# Patient Record
Sex: FEMALE | Race: WHITE
[De-identification: names, ages, dates, MRNs, and addresses within clinical notes are randomized per-mention and may not be internally consistent; named-entity substitution may affect disease eponyms.]

---

## 2017-03-09 ENCOUNTER — HOSPITAL ENCOUNTER (OUTPATIENT)
Dept: HOSPITAL 23 - CAMB | Age: 57
Discharge: HOME | End: 2017-03-09
Payer: COMMERCIAL

## 2017-03-09 DIAGNOSIS — Z01.818: Primary | ICD-10-CM

## 2017-03-09 DIAGNOSIS — F33.2: ICD-10-CM

## 2017-03-09 LAB
BARBITURATES UR QL SCN: 0.7 MG/DL (ref 0.2–2)
BARBITURATES UR QL SCN: 4.9 G/DL (ref 3.5–5)
BENZODIAZ UR QL SCN: 29 U/L (ref 10–42)
BENZODIAZ UR QL SCN: 32 U/L (ref 10–40)
BLOOD UREA NITROGEN: 12 MG/DL (ref 9–23)
BUN/CREATININE RATIO: 13.33
BZE UR QL SCN: 92 U/L (ref 32–92)
CALCIUM SERUM: 9.4 MG/DL (ref 8.4–10.2)
CK MB SERPL-RTO: 12.6 % (ref 11–15.5)
CK MB SERPL-RTO: 34.4 G/DL (ref 30–36)
CREATININE SERUM: 0.9 MG/DL (ref 0.6–1.4)
GLOM FILT RATE ESTIMATED: (no result) ML/MIN (ref 60–?)
GLUCOSE FASTING: 96 MG/DL (ref 70–110)
HEMATOCRIT: 41.8 % (ref 35–45)
HEMOGLOBIN: 14.4 GM/DL (ref 12–16)
KETONES UR QL: 107 MMOL/L (ref 100–111)
KETONES UR QL: 23 MMOL/L (ref 22–31)
MEAN CELL VOLUME: 90.7 FL (ref 83–96)
MEAN CORPUSCULAR HEMOGLOBIN: 31.2 PG (ref 28–34)
MEAN PLATELET VOLUME: 8.4 FL (ref 6.5–11.5)
PLATELET COUNT: 281 X10E3 (ref 140–420)
POTASSIUM: 4.1 MMOL/L (ref 3.5–5.1)
PROTEIN TOTAL SERUM: 7.6 G/DL (ref 6–8.3)
RED BLOOD COUNT: 4.61 X10E (ref 3.9–5.3)
SODIUM: 137 MMOL/L (ref 135–145)
U HYALINE CASTS AUWI: (no result) /[LPF]
URBCS1 AUWI: (no result) /[HPF] (ref 0–2)
URINE APPEARANCE: (no result)
URINE BACTERIA AUWI: (no result)
URINE BILIRUBIN: (no result)
URINE BLOOD: (no result)
URINE COLOR: YELLOW
URINE GLUCOSE: (no result) MG/DL
URINE KETONE: (no result)
URINE LEUKOCYTE ESTERASE: (no result)
URINE NITRATE: (no result)
URINE PH: 6 (ref 5–8)
URINE PROTEIN: (no result)
URINE SOURCE: (no result)
URINE SPECIFIC GRAVITY: 1.01 (ref 1–1.03)
URINE SQUAMOUS EPITHELIAL CELL: (no result) /[HPF]
URINE UROBILINOGEN: 0.2 MG/DL
UWBCS1 AUWI: (no result) (ref 0–5)
WHITE BLOOD COUNT: 5.9 X10E3 (ref 4–10.5)

## 2017-03-10 ENCOUNTER — HOSPITAL ENCOUNTER (OUTPATIENT)
Dept: HOSPITAL 23 - CSUR | Age: 57
Discharge: HOME | End: 2017-03-10
Payer: COMMERCIAL

## 2017-03-10 DIAGNOSIS — E03.9: ICD-10-CM

## 2017-03-10 DIAGNOSIS — F33.2: Primary | ICD-10-CM

## 2017-03-10 DIAGNOSIS — Z90.710: ICD-10-CM

## 2017-03-10 DIAGNOSIS — Z79.899: ICD-10-CM

## 2017-03-10 DIAGNOSIS — Z98.890: ICD-10-CM

## 2017-03-10 DIAGNOSIS — E78.5: ICD-10-CM

## 2017-03-10 DIAGNOSIS — Z88.8: ICD-10-CM

## 2017-03-13 ENCOUNTER — HOSPITAL ENCOUNTER (OUTPATIENT)
Dept: HOSPITAL 23 - CSUR | Age: 57
Discharge: HOME | End: 2017-03-13
Payer: COMMERCIAL

## 2017-03-13 DIAGNOSIS — Z88.8: ICD-10-CM

## 2017-03-13 DIAGNOSIS — Z90.710: ICD-10-CM

## 2017-03-13 DIAGNOSIS — Z85.3: ICD-10-CM

## 2017-03-13 DIAGNOSIS — E78.5: ICD-10-CM

## 2017-03-13 DIAGNOSIS — E03.9: ICD-10-CM

## 2017-03-13 DIAGNOSIS — F33.2: Primary | ICD-10-CM

## 2017-03-13 DIAGNOSIS — Z96.641: ICD-10-CM

## 2017-03-13 DIAGNOSIS — Z79.899: ICD-10-CM

## 2017-03-15 ENCOUNTER — HOSPITAL ENCOUNTER (OUTPATIENT)
Dept: HOSPITAL 23 - CSUR | Age: 57
Discharge: HOME | End: 2017-03-15
Payer: COMMERCIAL

## 2017-03-15 DIAGNOSIS — E78.5: ICD-10-CM

## 2017-03-15 DIAGNOSIS — Z96.641: ICD-10-CM

## 2017-03-15 DIAGNOSIS — Z98.890: ICD-10-CM

## 2017-03-15 DIAGNOSIS — Z88.8: ICD-10-CM

## 2017-03-15 DIAGNOSIS — Z90.710: ICD-10-CM

## 2017-03-15 DIAGNOSIS — F33.2: Primary | ICD-10-CM

## 2017-03-15 DIAGNOSIS — E03.9: ICD-10-CM

## 2017-03-15 DIAGNOSIS — Z79.899: ICD-10-CM

## 2017-03-20 ENCOUNTER — HOSPITAL ENCOUNTER (OUTPATIENT)
Dept: HOSPITAL 23 - CSUR | Age: 57
Discharge: HOME | End: 2017-03-20
Payer: COMMERCIAL

## 2017-03-20 DIAGNOSIS — Z88.6: ICD-10-CM

## 2017-03-20 DIAGNOSIS — E03.9: ICD-10-CM

## 2017-03-20 DIAGNOSIS — Z79.899: ICD-10-CM

## 2017-03-20 DIAGNOSIS — Z90.710: ICD-10-CM

## 2017-03-20 DIAGNOSIS — E78.00: ICD-10-CM

## 2017-03-20 DIAGNOSIS — F33.2: Primary | ICD-10-CM

## 2017-03-22 ENCOUNTER — HOSPITAL ENCOUNTER (OUTPATIENT)
Dept: HOSPITAL 23 - CSUR | Age: 57
Discharge: HOME | End: 2017-03-22
Payer: COMMERCIAL

## 2017-03-22 DIAGNOSIS — Z88.8: ICD-10-CM

## 2017-03-22 DIAGNOSIS — F33.2: Primary | ICD-10-CM

## 2017-03-22 DIAGNOSIS — Z79.899: ICD-10-CM

## 2017-03-22 DIAGNOSIS — E03.9: ICD-10-CM

## 2017-03-24 ENCOUNTER — HOSPITAL ENCOUNTER (OUTPATIENT)
Dept: HOSPITAL 23 - CSUR | Age: 57
Discharge: HOME | End: 2017-03-24
Payer: COMMERCIAL

## 2017-03-24 DIAGNOSIS — Z96.641: ICD-10-CM

## 2017-03-24 DIAGNOSIS — Z88.6: ICD-10-CM

## 2017-03-24 DIAGNOSIS — Z79.899: ICD-10-CM

## 2017-03-24 DIAGNOSIS — F33.2: Primary | ICD-10-CM

## 2017-03-24 DIAGNOSIS — E03.9: ICD-10-CM

## 2017-03-24 DIAGNOSIS — Z90.710: ICD-10-CM

## 2017-04-05 ENCOUNTER — HOSPITAL ENCOUNTER (OUTPATIENT)
Dept: HOSPITAL 23 - CSUR | Age: 57
Discharge: HOME | End: 2017-04-05
Payer: COMMERCIAL

## 2017-04-05 DIAGNOSIS — E78.00: ICD-10-CM

## 2017-04-05 DIAGNOSIS — F33.2: Primary | ICD-10-CM

## 2017-04-05 DIAGNOSIS — Z88.6: ICD-10-CM

## 2017-04-05 DIAGNOSIS — E03.9: ICD-10-CM

## 2017-04-05 DIAGNOSIS — Z79.899: ICD-10-CM

## 2017-04-28 ENCOUNTER — HOSPITAL ENCOUNTER (OUTPATIENT)
Dept: HOSPITAL 23 - CSUR | Age: 57
Discharge: HOME | End: 2017-04-28
Payer: COMMERCIAL

## 2017-04-28 DIAGNOSIS — F33.2: Primary | ICD-10-CM

## 2017-05-22 ENCOUNTER — HOSPITAL ENCOUNTER (OUTPATIENT)
Dept: HOSPITAL 23 - CSUR | Age: 57
Discharge: HOME | End: 2017-05-22
Attending: PSYCHIATRY & NEUROLOGY
Payer: COMMERCIAL

## 2017-05-22 DIAGNOSIS — Z79.899: ICD-10-CM

## 2017-05-22 DIAGNOSIS — Z88.6: ICD-10-CM

## 2017-05-22 DIAGNOSIS — E03.9: ICD-10-CM

## 2017-05-22 DIAGNOSIS — M19.90: ICD-10-CM

## 2017-05-22 DIAGNOSIS — E78.5: ICD-10-CM

## 2017-05-22 DIAGNOSIS — Z90.710: ICD-10-CM

## 2017-05-22 DIAGNOSIS — F33.2: Primary | ICD-10-CM

## 2017-05-22 DIAGNOSIS — Z85.3: ICD-10-CM

## 2017-05-22 DIAGNOSIS — Z88.8: ICD-10-CM

## 2017-05-22 RX ORDER — ATORVASTATIN CALCIUM 20 MG/1
TABLET, FILM COATED ORAL
Qty: 30 TABLET | Refills: 0 | OUTPATIENT
Start: 2017-05-22

## 2017-06-30 ENCOUNTER — HOSPITAL ENCOUNTER (OUTPATIENT)
Dept: HOSPITAL 23 - CSUR | Age: 57
Discharge: HOME | End: 2017-06-30
Attending: PSYCHIATRY & NEUROLOGY
Payer: COMMERCIAL

## 2017-06-30 DIAGNOSIS — F33.2: Primary | ICD-10-CM

## 2017-06-30 DIAGNOSIS — E03.9: ICD-10-CM

## 2017-06-30 DIAGNOSIS — Z90.710: ICD-10-CM

## 2017-06-30 DIAGNOSIS — Z79.899: ICD-10-CM

## 2017-06-30 DIAGNOSIS — Z88.8: ICD-10-CM

## 2017-06-30 DIAGNOSIS — Z85.3: ICD-10-CM

## 2017-06-30 DIAGNOSIS — M19.90: ICD-10-CM

## 2017-07-21 ENCOUNTER — HOSPITAL ENCOUNTER (OUTPATIENT)
Dept: HOSPITAL 23 - CSUR | Age: 57
Discharge: HOME | End: 2017-07-21
Attending: PSYCHIATRY & NEUROLOGY
Payer: COMMERCIAL

## 2017-07-21 DIAGNOSIS — Z88.8: ICD-10-CM

## 2017-07-21 DIAGNOSIS — M19.90: ICD-10-CM

## 2017-07-21 DIAGNOSIS — Z98.890: ICD-10-CM

## 2017-07-21 DIAGNOSIS — Z79.899: ICD-10-CM

## 2017-07-21 DIAGNOSIS — Z90.710: ICD-10-CM

## 2017-07-21 DIAGNOSIS — E03.9: ICD-10-CM

## 2017-07-21 DIAGNOSIS — Z85.3: ICD-10-CM

## 2017-07-21 DIAGNOSIS — F33.2: Primary | ICD-10-CM

## 2017-07-26 ENCOUNTER — HOSPITAL ENCOUNTER (OUTPATIENT)
Dept: HOSPITAL 23 - CSUR | Age: 57
Discharge: HOME | End: 2017-07-26
Attending: PSYCHIATRY & NEUROLOGY
Payer: COMMERCIAL

## 2017-07-26 DIAGNOSIS — Z90.710: ICD-10-CM

## 2017-07-26 DIAGNOSIS — Z96.641: ICD-10-CM

## 2017-07-26 DIAGNOSIS — F33.2: Primary | ICD-10-CM

## 2017-07-26 DIAGNOSIS — E03.9: ICD-10-CM

## 2017-07-26 DIAGNOSIS — Z98.890: ICD-10-CM

## 2017-07-26 DIAGNOSIS — Z88.8: ICD-10-CM

## 2017-07-26 DIAGNOSIS — Z79.899: ICD-10-CM

## 2017-08-07 ENCOUNTER — HOSPITAL ENCOUNTER (OUTPATIENT)
Dept: HOSPITAL 23 - CSUR | Age: 57
Discharge: HOME | End: 2017-08-07
Attending: PSYCHIATRY & NEUROLOGY
Payer: COMMERCIAL

## 2017-08-07 DIAGNOSIS — Z90.710: ICD-10-CM

## 2017-08-07 DIAGNOSIS — E03.9: ICD-10-CM

## 2017-08-07 DIAGNOSIS — Z96.641: ICD-10-CM

## 2017-08-07 DIAGNOSIS — F33.2: Primary | ICD-10-CM

## 2017-08-07 DIAGNOSIS — M19.90: ICD-10-CM

## 2017-08-07 DIAGNOSIS — Z90.12: ICD-10-CM

## 2017-08-07 DIAGNOSIS — Z88.6: ICD-10-CM

## 2017-08-07 DIAGNOSIS — Z85.828: ICD-10-CM

## 2017-08-07 DIAGNOSIS — Z98.890: ICD-10-CM

## 2017-11-03 ENCOUNTER — OFFICE VISIT (OUTPATIENT)
Dept: FAMILY MEDICINE CLINIC | Facility: CLINIC | Age: 57
End: 2017-11-03

## 2017-11-03 VITALS
RESPIRATION RATE: 16 BRPM | HEIGHT: 64 IN | OXYGEN SATURATION: 98 % | DIASTOLIC BLOOD PRESSURE: 78 MMHG | WEIGHT: 140 LBS | TEMPERATURE: 97.7 F | HEART RATE: 98 BPM | BODY MASS INDEX: 23.9 KG/M2 | SYSTOLIC BLOOD PRESSURE: 110 MMHG

## 2017-11-03 DIAGNOSIS — F41.9 ANXIETY: ICD-10-CM

## 2017-11-03 DIAGNOSIS — F33.2 SEVERE EPISODE OF RECURRENT MAJOR DEPRESSIVE DISORDER, WITHOUT PSYCHOTIC FEATURES (HCC): Primary | ICD-10-CM

## 2017-11-03 DIAGNOSIS — E03.9 ACQUIRED HYPOTHYROIDISM: ICD-10-CM

## 2017-11-03 DIAGNOSIS — M16.12 PRIMARY OSTEOARTHRITIS OF LEFT HIP: ICD-10-CM

## 2017-11-03 PROCEDURE — 99215 OFFICE O/P EST HI 40 MIN: CPT | Performed by: FAMILY MEDICINE

## 2017-11-03 RX ORDER — MULTIPLE VITAMINS W/ MINERALS TAB 9MG-400MCG
1 TAB ORAL DAILY
COMMUNITY

## 2017-11-03 RX ORDER — CHLORAL HYDRATE 500 MG
1 CAPSULE ORAL
COMMUNITY

## 2017-11-04 NOTE — PROGRESS NOTES
"Subjective   Yajaira Mccoy is a 57 y.o. female. Pt presented to the office alone to establish care, her prior PCP is Dr. Simona Patrick. She has concerns about her uncontrolled anxiety and depression.    Chief Complaint   Patient presents with   • Spot on Nose   • Establish Care     New Patient        History of Present Illness    Depression and Anxiety  This is a chronic problem for the pt. with onset dating back to 1993 when she was diagnosed with breast cancer. She did well with treatment of her breast cancer and reported that after about two years she really \"bounced back strong.\" In 2010 she had to have breast biopsy for what turned out to be benign lesion, but her depression recurred and has persisted and been difficult to treat since that time. She has worked with Dr. Joseline Serrano with [facility] psychiatry for the past 6 years. She started doing bilateral ECT 4 years ago with great success except the significant memory loss that was associated with the procedure became too troublesome and she discontinued the therapy. In [place] 2016 she resumed ECT, unilateral, with Dr. White at [facility] and is in the maintenance phase with a treatment due in 4 days. This is quite a time commitment as it requires hospital admission and she wonders about the long-term effects. So she started TMS (Transcranial Magnetic Stimulation) with Dr. Mendenhall recently and is still in the start up period, each session is out of pocket $300. She did learn that it would be safe for her to continue with her ECT maintenance treatments while participating in TMS. She also sees a counselor at [facility], has been going for years, gets along well with her counselor, has not been in several weeks.   Tested for effective antidepressant therapies and bupropion was indicated as appropriate. Has been on for little over one year and feels it has done better than all prior medications. Was recently increased from 300 mg daily to 375 mg daily. At this " time she was also increased on her alprazolam from 0.5 mg daily to 2 mg daily which she takes in the morning. She hates that she needs this, but she really does benefit from it, and hopes to wean back down. It has been stressful few weeks with her .   Pt reported appetite has been normal, she goes to Yoga 3 d/wk and swims 3-4 d/wk, [employer] on Sundays. She does want to sleep more, yesterday slept 12 hours overnight, she has insomnia, trouble falling asleep and has been taking Z-quil, Nyquil in the same night for sleep.   Has suicide attempt with medication overdose in 1993, her  found her and took her to the hospital. She reported she does think about killing herself but does not have a plan.   She has social stressors that exacerbate her depression. She moved here for her 's job and she is away from her close friends and daughters. She had tight knit group of friends and was entertaining friends weekly. She has not been able to make friends here. Visits her friends and daughters many weekends. Feels it is hard to talk to them about her depression because it makes them uncomfortable. Feels they try to keep their distance from her because of her depression.     Breast Cancer  1993, estrogen receptor positive, treated in [place], lumpectomy, radiation, chemo followed by 5 years of tamoxifen. Because ER + had hysterectomy around time she completed tamoxifen. Last mammogram was normal this spring.     Thyroid nodule  Hx of. Hypothyroid. Has had biopsy and nodules were not cancerous. Has not had follow up with ENT. Taking stable does of levothyroxine.      The following portions of the patient's history were reviewed and updated as appropriate: allergies, current medications, past family history, past medical history, past social history, past surgical history and problem list.          Review of Systems   Constitutional: Positive for fatigue. Negative for activity change, appetite change and  "unexpected weight change.   HENT: Negative for congestion, ear pain, postnasal drip, rhinorrhea and sinus pain.    Cardiovascular: Negative for chest pain.   Gastrointestinal: Negative for abdominal pain, blood in stool, constipation, diarrhea, nausea and vomiting.   Musculoskeletal: Negative for gait problem and joint swelling.   Neurological: Negative for weakness and headaches.   Psychiatric/Behavioral: Positive for dysphoric mood and sleep disturbance. Negative for self-injury and suicidal ideas. The patient is nervous/anxious.        Objective   Blood pressure 110/78, pulse 98, temperature 97.7 °F (36.5 °C), temperature source Oral, resp. rate 16, height 63.5\" (161.3 cm), weight 140 lb (63.5 kg), SpO2 98 %, not currently breastfeeding.  Physical Exam   Constitutional: She is oriented to person, place, and time. She appears well-nourished.   HENT:   Right Ear: External ear normal.   Left Ear: External ear normal.   Nose: Nose normal.   Eyes: Conjunctivae and EOM are normal. Right eye exhibits no discharge. Left eye exhibits no discharge. No scleral icterus.   Cardiovascular: Normal rate, regular rhythm, normal heart sounds and intact distal pulses.  Exam reveals no gallop and no friction rub.    No murmur heard.  Pulmonary/Chest: Effort normal and breath sounds normal. No respiratory distress. She has no wheezes. She has no rales.   Abdominal: Soft. Bowel sounds are normal. She exhibits no distension and no mass. There is no tenderness. There is no guarding.   Musculoskeletal: She exhibits no edema or deformity.   Neurological: She is alert and oriented to person, place, and time. She exhibits normal muscle tone. Coordination normal.   Skin: Skin is warm and dry.   Psychiatric:   Patient was tearful through most of appointment. Mood depressed and affect congruent.    Vitals reviewed.      Assessment/Plan   Yajaira was seen today for spot on nose and establish care.    Diagnoses and all orders for this " visit:    Severe episode of recurrent major depressive disorder, without psychotic features    Acquired hypothyroidism    Primary osteoarthritis of left hip    Anxiety    Other orders  -     Cancel: TSH Rfx On Abnormal To Free T4  -     Cancel: Vitamin D 1,25 Dihydroxy; Future      MDD: severe episode of recurrance  #1 discussed course of action with pt at length including her hx, therapies previously tried, social stressors  #2 encouraged pt to get back in regularly with her counselor  #3 discuss with her psychiatrist best plan in terms of continuing ECT vs TMS or both for a period of time  #4 pt does not need refills on medications, thought best to continue her current medication given prior efficacy and risk of worsening with weaning medication  #5 also discussed concern that maybe increase in bupropion could have exacerbated anxiety, worth asking her psychiatrist  #6 discussed getting family and friends involved and different ways to possibly do that, also encouraged her to engage with people at [employer], [employer] for more interaction, relationships, and things to look forward to  #7 discussed prior suicide attempt and pt has thoughts of suicide but no plan, says she just goes to bed when she gets the thoughts, encouraged her to call hot-line, call family, go to the hospital  #8 call office as she needs to, f/u in one month    Hypothyroid  #1 hx of nodules, established with ENT in [place], will have her follow up  #2 continue current dose of levothyroxine, TSH ordered, pt to return for collection as she has a TMS appointment to get to    Right hip osteoarthritis  #1 hx of left hip replacement with great success, interested in getting right done prior to the end of the year  #2 concern with rushing into surgery and missing appointments for TMS, ECT, counseling while recovering, encouraged her to consider discussing with ortho and maybe postpone and focus on maximum treatment of her depression and anxiety at  this time    Spent 80 minutes with pt and >50 % of the time was spent on the above mentioned counseling in regards to her depression, treatment, and her risk factors

## 2017-12-08 ENCOUNTER — OFFICE VISIT (OUTPATIENT)
Dept: FAMILY MEDICINE CLINIC | Facility: CLINIC | Age: 57
End: 2017-12-08

## 2017-12-08 VITALS
OXYGEN SATURATION: 98 % | TEMPERATURE: 97.7 F | DIASTOLIC BLOOD PRESSURE: 80 MMHG | SYSTOLIC BLOOD PRESSURE: 120 MMHG | HEIGHT: 64 IN | WEIGHT: 139 LBS | HEART RATE: 79 BPM | BODY MASS INDEX: 23.73 KG/M2

## 2017-12-08 DIAGNOSIS — E03.9 ACQUIRED HYPOTHYROIDISM: Primary | ICD-10-CM

## 2017-12-08 DIAGNOSIS — F33.2 SEVERE EPISODE OF RECURRENT MAJOR DEPRESSIVE DISORDER, WITHOUT PSYCHOTIC FEATURES (HCC): ICD-10-CM

## 2017-12-08 DIAGNOSIS — E04.2 MULTIPLE THYROID NODULES: ICD-10-CM

## 2017-12-08 PROCEDURE — 99214 OFFICE O/P EST MOD 30 MIN: CPT | Performed by: FAMILY MEDICINE

## 2017-12-08 RX ORDER — LORAZEPAM 1 MG/1
TABLET ORAL
COMMUNITY
Start: 2017-12-04 | End: 2018-05-21 | Stop reason: SDUPTHER

## 2017-12-08 RX ORDER — PRAMIPEXOLE DIHYDROCHLORIDE 0.5 MG/1
TABLET ORAL
COMMUNITY
Start: 2017-12-01 | End: 2018-05-21

## 2017-12-08 RX ORDER — BUPROPION HYDROCHLORIDE 75 MG/1
300 TABLET ORAL DAILY
COMMUNITY
Start: 2017-12-04 | End: 2019-05-17 | Stop reason: DRUGHIGH

## 2017-12-08 RX ORDER — TRIAMCINOLONE ACETONIDE 0.1 %
PASTE (GRAM) DENTAL
COMMUNITY
Start: 2017-10-17 | End: 2017-12-08

## 2017-12-08 RX ORDER — PRAMIPEXOLE DIHYDROCHLORIDE 0.25 MG/1
TABLET ORAL
COMMUNITY
Start: 2017-11-11 | End: 2017-12-08

## 2017-12-08 NOTE — PROGRESS NOTES
"Subjective   Yajaira Mccoy is a 57 y.o. female. Pt is here for follow up of depression and anxiety and hypothyroidism with multiple nodules. She is getting ready to have hip surgery in January 2018.    Chief Complaint   Patient presents with   • Hypothyroidism     follow up        History of Present Illness    Depression  Pt has continued on the wellbutrin 375 mg, ativan prn. She has started taking DOLLY supplement in place of the ativan and finds that it has been very helpful. Would like to be more natural and take less medications. She completed her ECT and has continued to do the TMS. She is very happy with her psychiatry care. She is 25 TMS sessions in and the target is 35. After that maintenance. She has good days and bad days but overall better than last time. Holidays are challenging emotionally. Loves to travel and see friends and family but hates to come back. Still talking with her  about her moving back to [facility] where she feels the best but she is reluctant. Still feels the anxiety is worse than the depression now.     Hypothyroidism  Has history of low thyroid and nodules, been a couple of years had an ultrasound and biopsy but never followed up with her ENT. Would like to take care of this before her hip replacement surgery.      The following portions of the patient's history were reviewed and updated as appropriate: allergies, current medications and problem list.      Review of Systems   Constitutional: Positive for activity change. Negative for appetite change.   Respiratory: Negative for chest tightness, shortness of breath and wheezing.    Cardiovascular: Negative for chest pain.   Musculoskeletal: Positive for arthralgias.       Objective   Blood pressure 120/80, pulse 79, temperature 97.7 °F (36.5 °C), height 161.3 cm (63.5\"), weight 63 kg (139 lb), SpO2 98 %, not currently breastfeeding.  Physical Exam   Constitutional: She is oriented to person, place, and time. She appears " well-nourished. No distress.   HENT:   Right Ear: External ear normal.   Left Ear: External ear normal.   Nose: Nose normal.   Eyes: Conjunctivae are normal. Right eye exhibits no discharge. Left eye exhibits no discharge. No scleral icterus.   Cardiovascular: Normal rate, regular rhythm and normal heart sounds.  Exam reveals no gallop and no friction rub.    No murmur heard.  Pulmonary/Chest: Effort normal and breath sounds normal. No respiratory distress. She has no wheezes. She has no rales.   Neurological: She is alert and oriented to person, place, and time. She exhibits normal muscle tone. Coordination normal.   Psychiatric: She has a normal mood and affect. Her behavior is normal.   Pt was little tearful when talking about difficulties at the holidays and depression.   Vitals reviewed.      Assessment/Plan   Yajaira was seen today for hypothyroidism.    Diagnoses and all orders for this visit:    Acquired hypothyroidism  -     US thyroid; Future    Multiple thyroid nodules  -     US thyroid; Future    Severe episode of recurrent major depressive disorder, without psychotic features      Pt scheduled this week for labs with gaytan 2/2 upcoming surgery. Will give script for TSH and vitamin D. Labs will be reviewed than will send letter for surgery clearance to ortho group.    Hypothyroid  TSH  Ultrasound  Pt to call and schedule with her established ENT for follow up    Depression and anxiety  Stable now with therapy  Again encouraged her to check with her psychiatrist about wellbutrin dose and worsened axiety

## 2017-12-18 ENCOUNTER — HOSPITAL ENCOUNTER (OUTPATIENT)
Dept: ULTRASOUND IMAGING | Facility: HOSPITAL | Age: 57
Discharge: HOME | End: 2017-12-18
Admitting: FAMILY MEDICINE

## 2017-12-18 DIAGNOSIS — E04.2 MULTIPLE THYROID NODULES: ICD-10-CM

## 2017-12-18 DIAGNOSIS — E03.9 ACQUIRED HYPOTHYROIDISM: ICD-10-CM

## 2017-12-18 PROCEDURE — 76536 US EXAM OF HEAD AND NECK: CPT

## 2017-12-26 ENCOUNTER — LAB (OUTPATIENT)
Dept: LAB | Facility: HOSPITAL | Age: 57
End: 2017-12-26

## 2017-12-26 ENCOUNTER — TRANSCRIBE ORDERS (OUTPATIENT)
Dept: ADMINISTRATIVE | Facility: HOSPITAL | Age: 57
End: 2017-12-26

## 2017-12-26 DIAGNOSIS — E03.9 MYXEDEMA HEART DISEASE: ICD-10-CM

## 2017-12-26 DIAGNOSIS — M25.50 PAIN IN JOINT, MULTIPLE SITES: Primary | ICD-10-CM

## 2017-12-26 DIAGNOSIS — E83.52 HYPERCALCEMIA: ICD-10-CM

## 2017-12-26 DIAGNOSIS — M25.50 PAIN IN JOINT, MULTIPLE SITES: ICD-10-CM

## 2017-12-26 DIAGNOSIS — I51.9 MYXEDEMA HEART DISEASE: ICD-10-CM

## 2017-12-26 LAB
CA-I BLD-MCNC: 5.1 MG/DL (ref 4.6–5.4)
CA-I SERPL ISE-MCNC: 1.28 MMOL/L (ref 1.15–1.35)
PTH-INTACT SERPL-MCNC: 41.5 PG/ML (ref 15–65)
T3FREE SERPL-MCNC: 3.13 PG/ML (ref 2–4.4)
T4 FREE SERPL-MCNC: 1.46 NG/DL (ref 0.93–1.7)
TSH SERPL DL<=0.05 MIU/L-ACNC: 1.86 MIU/ML (ref 0.27–4.2)

## 2017-12-26 PROCEDURE — 84439 ASSAY OF FREE THYROXINE: CPT | Performed by: PHYSICIAN ASSISTANT

## 2017-12-26 PROCEDURE — 83970 ASSAY OF PARATHORMONE: CPT | Performed by: PHYSICIAN ASSISTANT

## 2017-12-26 PROCEDURE — 36415 COLL VENOUS BLD VENIPUNCTURE: CPT

## 2017-12-26 PROCEDURE — 82330 ASSAY OF CALCIUM: CPT | Performed by: PHYSICIAN ASSISTANT

## 2017-12-26 PROCEDURE — 84481 FREE ASSAY (FT-3): CPT | Performed by: PHYSICIAN ASSISTANT

## 2017-12-26 PROCEDURE — 84443 ASSAY THYROID STIM HORMONE: CPT

## 2018-01-09 ENCOUNTER — TELEPHONE (OUTPATIENT)
Dept: FAMILY MEDICINE CLINIC | Facility: CLINIC | Age: 58
End: 2018-01-09

## 2018-01-09 NOTE — TELEPHONE ENCOUNTER
Nurse from Dr Olvera office called stating that the patient is scheduled to have a total knee replacement on 1/16 and was recently seen by Dr Coates for surgery clearance the office is needing a note for clearance faxed to her office asa fax number is [phone]

## 2018-02-02 DIAGNOSIS — E03.9 ACQUIRED HYPOTHYROIDISM: ICD-10-CM

## 2018-02-02 RX ORDER — LEVOTHYROXINE SODIUM 0.05 MG/1
50 TABLET ORAL DAILY
Qty: 90 TABLET | Refills: 1 | Status: SHIPPED | OUTPATIENT
Start: 2018-02-02 | End: 2018-03-15 | Stop reason: SDUPTHER

## 2018-03-13 ENCOUNTER — TELEPHONE (OUTPATIENT)
Dept: FAMILY MEDICINE CLINIC | Facility: CLINIC | Age: 58
End: 2018-03-13

## 2018-03-15 DIAGNOSIS — E03.9 ACQUIRED HYPOTHYROIDISM: ICD-10-CM

## 2018-03-15 RX ORDER — LEVOTHYROXINE SODIUM 0.05 MG/1
50 TABLET ORAL DAILY
Qty: 90 TABLET | Refills: 1 | Status: SHIPPED | OUTPATIENT
Start: 2018-03-15 | End: 2018-06-22 | Stop reason: SDUPTHER

## 2018-05-21 ENCOUNTER — TELEPHONE (OUTPATIENT)
Dept: FAMILY MEDICINE CLINIC | Facility: CLINIC | Age: 58
End: 2018-05-21

## 2018-05-21 ENCOUNTER — OFFICE VISIT (OUTPATIENT)
Dept: FAMILY MEDICINE CLINIC | Facility: CLINIC | Age: 58
End: 2018-05-21

## 2018-05-21 VITALS
WEIGHT: 147.5 LBS | HEIGHT: 64 IN | OXYGEN SATURATION: 97 % | TEMPERATURE: 97.8 F | DIASTOLIC BLOOD PRESSURE: 60 MMHG | SYSTOLIC BLOOD PRESSURE: 115 MMHG | HEART RATE: 70 BPM | BODY MASS INDEX: 25.18 KG/M2

## 2018-05-21 DIAGNOSIS — E03.9 HYPOTHYROIDISM, UNSPECIFIED TYPE: ICD-10-CM

## 2018-05-21 DIAGNOSIS — E53.8 VITAMIN B12 DEFICIENCY: ICD-10-CM

## 2018-05-21 DIAGNOSIS — E55.9 VITAMIN D DEFICIENCY: Primary | ICD-10-CM

## 2018-05-21 PROCEDURE — 99213 OFFICE O/P EST LOW 20 MIN: CPT | Performed by: NURSE PRACTITIONER

## 2018-05-21 RX ORDER — PRAMIPEXOLE DIHYDROCHLORIDE 0.5 MG/1
0.5 TABLET ORAL DAILY
COMMUNITY
End: 2019-05-17

## 2018-05-21 NOTE — TELEPHONE ENCOUNTER
Spoke with patient about her request  of LORAZEPAM, Dr Coates is suggesting that she see her psychiatrist for this medication. Patient stated that she just started seeing a new psychiatrit due to her old one retiring.

## 2018-05-21 NOTE — TELEPHONE ENCOUNTER
Patient called stating that she needs a refill send to [facility] for her LORAZEPAM she is out of the medication, may need a temporary sent to local pharmacy

## 2018-05-21 NOTE — PATIENT INSTRUCTIONS
Labs in the next week.    Fax paperwork to [phone]  Letter provided today.  Follow up with Dr Coates in 4-6 months

## 2018-05-21 NOTE — PROGRESS NOTES
Subjective   Yajaira Mccoy is a 58 y.o. 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 States she is required to be free from communicable disease in order to renew her license.     Insomnia   This is a new problem. The current episode started more than 1 month ago. The problem occurs intermittently. The problem has been waxing and waning. Pertinent negatives include no abdominal pain, anorexia, arthralgias, change in bowel habit, chest pain, chills, congestion, coughing, diaphoresis, fatigue, fever, headaches, joint swelling, myalgias, nausea, neck pain, numbness, rash, sore throat, swollen glands, urinary symptoms, vertigo, visual change, vomiting or weakness. Nothing aggravates the symptoms. Treatments tried: melatonin 10 mg. The treatment provided mild relief.   Hypothyroidism   This is a chronic problem. The current episode started more than 1 year ago. The problem occurs daily. The problem has been unchanged. Pertinent negatives include no abdominal pain, anorexia, arthralgias, change in bowel habit, chest pain, chills, congestion, coughing, diaphoresis, fatigue, fever, headaches, joint swelling, myalgias, nausea, neck pain, numbness, rash, sore throat, swollen glands, urinary symptoms, vertigo, visual change, vomiting or weakness. Nothing aggravates the symptoms. Treatments tried: levothyroxine. The treatment provided moderate relief.        The following portions of the patient's history were reviewed and updated as appropriate: allergies, current medications, past family history, past medical history, past social history, past surgical history and problem list.    Review of Systems   Constitutional: Negative.  Negative for chills, diaphoresis, fatigue and fever.   HENT: Negative.  Negative for congestion and sore throat.    Eyes: Negative.    Respiratory: Negative.  Negative for cough.    Cardiovascular: Negative.  Negative for chest pain.    Gastrointestinal: Negative.  Negative for abdominal pain, anorexia, change in bowel habit, nausea and vomiting.   Endocrine: Negative.    Genitourinary: Negative.    Musculoskeletal: Negative.  Negative for arthralgias, joint swelling, myalgias and neck pain.   Skin: Negative.  Negative for rash.   Allergic/Immunologic: Negative.    Neurological: Negative.  Negative for vertigo, weakness, numbness and headaches.   Hematological: Negative.    Psychiatric/Behavioral: Positive for sleep disturbance (started melatonin 10 with little relief). The patient has insomnia.        Objective   Physical Exam   Constitutional: She is oriented to person, place, and time. She appears well-developed and well-nourished.   HENT:   Head: Normocephalic.   Neck: Neck supple.   Cardiovascular: Normal rate, regular rhythm and normal heart sounds.  Exam reveals no gallop and no friction rub.    No murmur heard.  Pulmonary/Chest: Breath sounds normal. No respiratory distress. She has no wheezes. She has no rales.   Neurological: She is alert and oriented to person, place, and time.   Skin: Skin is warm and dry.   Psychiatric: She has a normal mood and affect.   Vitals reviewed.     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    Diagnoses and all orders for this visit:    Vitamin D deficiency  -     Vitamin D 25 Hydroxy; Future    Vitamin B12 deficiency  -     Vitamin B12; Future    Hypothyroidism, unspecified type  -     Thyroid Panel With TSH; Future      Will return in the future for labs. States her psychiatrist recommended another test and she would like to have them all completed at the same time.  Ok to proceed with licensing

## 2018-05-22 NOTE — TELEPHONE ENCOUNTER
She needs to call her psychiatry office. Just wanted to put it on record. I know we already talked about her.

## 2018-05-23 DIAGNOSIS — Z12.39 SCREENING FOR BREAST CANCER: Primary | ICD-10-CM

## 2018-05-23 DIAGNOSIS — Z85.3 HISTORY OF BREAST CANCER: ICD-10-CM

## 2018-05-24 ENCOUNTER — TRANSCRIBE ORDERS (OUTPATIENT)
Dept: ADMINISTRATIVE | Facility: HOSPITAL | Age: 58
End: 2018-05-24

## 2018-05-24 DIAGNOSIS — Z12.31 VISIT FOR SCREENING MAMMOGRAM: Primary | ICD-10-CM

## 2018-05-26 ENCOUNTER — RESULTS ENCOUNTER (OUTPATIENT)
Dept: FAMILY MEDICINE CLINIC | Facility: CLINIC | Age: 58
End: 2018-05-26

## 2018-05-26 DIAGNOSIS — E55.9 VITAMIN D DEFICIENCY: ICD-10-CM

## 2018-05-26 DIAGNOSIS — E03.9 HYPOTHYROIDISM, UNSPECIFIED TYPE: ICD-10-CM

## 2018-05-26 DIAGNOSIS — E53.8 VITAMIN B12 DEFICIENCY: ICD-10-CM

## 2018-06-05 LAB
25(OH)D3+25(OH)D2 SERPL-MCNC: 43.6 NG/ML (ref 30–100)
FT4I SERPL CALC-MCNC: 2.4 (ref 1.2–4.9)
T3RU NFR SERPL: 28 % (ref 24–39)
T4 SERPL-MCNC: 8.6 UG/DL (ref 4.5–12)
TSH SERPL DL<=0.005 MIU/L-ACNC: 1.9 UIU/ML (ref 0.45–4.5)
VIT B12 SERPL-MCNC: 899 PG/ML (ref 211–946)

## 2018-06-08 ENCOUNTER — HOSPITAL ENCOUNTER (OUTPATIENT)
Dept: MAMMOGRAPHY | Facility: HOSPITAL | Age: 58
Discharge: HOME | End: 2018-06-08
Admitting: FAMILY MEDICINE

## 2018-06-08 DIAGNOSIS — Z12.31 VISIT FOR SCREENING MAMMOGRAM: ICD-10-CM

## 2018-06-08 PROCEDURE — 77063 BREAST TOMOSYNTHESIS BI: CPT

## 2018-06-08 PROCEDURE — 77067 SCR MAMMO BI INCL CAD: CPT

## 2018-06-22 ENCOUNTER — TELEPHONE (OUTPATIENT)
Dept: FAMILY MEDICINE CLINIC | Facility: CLINIC | Age: 58
End: 2018-06-22

## 2018-06-22 DIAGNOSIS — E78.5 HYPERLIPIDEMIA, UNSPECIFIED HYPERLIPIDEMIA TYPE: ICD-10-CM

## 2018-06-22 DIAGNOSIS — E03.9 ACQUIRED HYPOTHYROIDISM: ICD-10-CM

## 2018-06-25 RX ORDER — ATORVASTATIN CALCIUM 20 MG/1
20 TABLET, FILM COATED ORAL DAILY
Qty: 90 TABLET | Refills: 0 | Status: SHIPPED | OUTPATIENT
Start: 2018-06-25 | End: 2018-08-27 | Stop reason: SDUPTHER

## 2018-06-25 RX ORDER — LEVOTHYROXINE SODIUM 0.05 MG/1
50 TABLET ORAL DAILY
Qty: 90 TABLET | Refills: 0 | Status: SHIPPED | OUTPATIENT
Start: 2018-06-25 | End: 2018-08-27 | Stop reason: SDUPTHER

## 2018-08-27 DIAGNOSIS — E78.5 HYPERLIPIDEMIA, UNSPECIFIED HYPERLIPIDEMIA TYPE: ICD-10-CM

## 2018-08-27 DIAGNOSIS — E03.9 ACQUIRED HYPOTHYROIDISM: ICD-10-CM

## 2018-08-27 RX ORDER — LEVOTHYROXINE SODIUM 0.05 MG/1
50 TABLET ORAL DAILY
Qty: 90 TABLET | Refills: 1 | Status: SHIPPED | OUTPATIENT
Start: 2018-08-27 | End: 2019-03-11 | Stop reason: SDUPTHER

## 2018-08-27 RX ORDER — ATORVASTATIN CALCIUM 20 MG/1
20 TABLET, FILM COATED ORAL DAILY
Qty: 90 TABLET | Refills: 1 | Status: SHIPPED | OUTPATIENT
Start: 2018-08-27 | End: 2019-02-23 | Stop reason: SDUPTHER

## 2018-11-05 ENCOUNTER — TELEPHONE (OUTPATIENT)
Dept: FAMILY MEDICINE CLINIC | Facility: CLINIC | Age: 58
End: 2018-11-05

## 2018-11-05 NOTE — TELEPHONE ENCOUNTER
Patient called stating that she stepped on a piece of glass a month ago and her foot is still sore and wants to know who can she go to get the glass removed

## 2018-11-08 ENCOUNTER — TELEPHONE (OUTPATIENT)
Dept: FAMILY MEDICINE CLINIC | Facility: CLINIC | Age: 58
End: 2018-11-08

## 2018-11-08 DIAGNOSIS — M79.673 PAIN OF FOOT, UNSPECIFIED LATERALITY: ICD-10-CM

## 2018-11-08 DIAGNOSIS — M79.5 FOREIGN BODY (FB) IN SOFT TISSUE: Primary | ICD-10-CM

## 2018-11-08 NOTE — TELEPHONE ENCOUNTER
Patient called stating that she stepped on a piece of glass about a month ago and the spot is  and sore. Patient is unsure what to do.

## 2018-11-09 ENCOUNTER — TELEPHONE (OUTPATIENT)
Dept: FAMILY MEDICINE CLINIC | Facility: CLINIC | Age: 58
End: 2018-11-09

## 2018-12-07 DIAGNOSIS — E03.9 ACQUIRED HYPOTHYROIDISM: Primary | ICD-10-CM

## 2018-12-08 ENCOUNTER — LAB (OUTPATIENT)
Dept: LAB | Facility: HOSPITAL | Age: 58
End: 2018-12-08

## 2018-12-08 DIAGNOSIS — E03.9 ACQUIRED HYPOTHYROIDISM: ICD-10-CM

## 2018-12-08 LAB — TSH SERPL DL<=0.05 MIU/L-ACNC: 1.68 MIU/ML (ref 0.27–4.2)

## 2018-12-08 PROCEDURE — 36415 COLL VENOUS BLD VENIPUNCTURE: CPT

## 2018-12-08 PROCEDURE — 84443 ASSAY THYROID STIM HORMONE: CPT

## 2019-02-23 DIAGNOSIS — E78.5 HYPERLIPIDEMIA, UNSPECIFIED HYPERLIPIDEMIA TYPE: ICD-10-CM

## 2019-02-25 RX ORDER — ATORVASTATIN CALCIUM 20 MG/1
20 TABLET, FILM COATED ORAL DAILY
Qty: 90 TABLET | Refills: 0 | Status: SHIPPED | OUTPATIENT
Start: 2019-02-25 | End: 2019-03-11 | Stop reason: SDUPTHER

## 2019-03-11 DIAGNOSIS — E78.5 HYPERLIPIDEMIA, UNSPECIFIED HYPERLIPIDEMIA TYPE: ICD-10-CM

## 2019-03-11 DIAGNOSIS — E03.9 ACQUIRED HYPOTHYROIDISM: ICD-10-CM

## 2019-03-11 RX ORDER — ATORVASTATIN CALCIUM 20 MG/1
20 TABLET, FILM COATED ORAL DAILY
Qty: 90 TABLET | Refills: 0 | Status: SHIPPED | OUTPATIENT
Start: 2019-03-11 | End: 2019-07-14 | Stop reason: SDUPTHER

## 2019-03-11 RX ORDER — LEVOTHYROXINE SODIUM 0.05 MG/1
50 TABLET ORAL DAILY
Qty: 90 TABLET | Refills: 1 | Status: SHIPPED | OUTPATIENT
Start: 2019-03-11 | End: 2019-08-22 | Stop reason: SDUPTHER

## 2019-05-17 ENCOUNTER — OFFICE VISIT (OUTPATIENT)
Dept: FAMILY MEDICINE CLINIC | Facility: CLINIC | Age: 59
End: 2019-05-17

## 2019-05-17 VITALS
DIASTOLIC BLOOD PRESSURE: 78 MMHG | HEART RATE: 77 BPM | WEIGHT: 151.9 LBS | RESPIRATION RATE: 18 BRPM | HEIGHT: 64 IN | TEMPERATURE: 98.4 F | SYSTOLIC BLOOD PRESSURE: 110 MMHG | OXYGEN SATURATION: 98 % | BODY MASS INDEX: 25.93 KG/M2

## 2019-05-17 DIAGNOSIS — N39.46 MIXED STRESS AND URGE URINARY INCONTINENCE: ICD-10-CM

## 2019-05-17 DIAGNOSIS — R15.2 INCONTINENCE OF FECES WITH FECAL URGENCY: Primary | ICD-10-CM

## 2019-05-17 DIAGNOSIS — Z12.31 ENCOUNTER FOR SCREENING MAMMOGRAM FOR BREAST CANCER: ICD-10-CM

## 2019-05-17 DIAGNOSIS — Z12.11 COLON CANCER SCREENING: ICD-10-CM

## 2019-05-17 DIAGNOSIS — R15.9 INCONTINENCE OF FECES WITH FECAL URGENCY: Primary | ICD-10-CM

## 2019-05-17 PROCEDURE — 99214 OFFICE O/P EST MOD 30 MIN: CPT | Performed by: FAMILY MEDICINE

## 2019-05-17 RX ORDER — BUPROPION HYDROCHLORIDE 300 MG/1
TABLET ORAL
COMMUNITY
Start: 2019-05-08

## 2019-05-17 RX ORDER — PRAMIPEXOLE DIHYDROCHLORIDE 1 MG/1
2 TABLET ORAL DAILY
COMMUNITY

## 2019-05-17 NOTE — PROGRESS NOTES
"Chief Complaint   Patient presents with   • Urine Leakage     present for years but getting worse       Subjective   Yajaira Mccoy is a 59 y.o. female.     History of Present Illness   Stool and urine incontinence  Happening sporadically over several years. Happening more often. She says happening every 1-3 weeks. Happen separately from each other. Happened this week with the stool. Been a while since it happened with the urine.   Her BMs range from constipated and not going to a blow out. Liquid stool. She has high fiber diet, lots of veggies. Other BMs are loose but not liquid. Smell is terrible. She had colonoscopy at age 48 in [place] said it was normal.     Dr. Wilder Gutierrez at [facility] for psych. He does a lot of research.   She did TMS in 2017 with Dr. Alarcon.  Feels she is doing really well right now.  He added pramipexole fairly recently and feels this is helped with both the depression and especially the anxiety.    The following portions of the patient's history were reviewed and updated as appropriate: allergies, current medications, past medical history, past social history and problem list.    Review of Systems   Respiratory: Negative.    Cardiovascular: Negative.    Genitourinary: Positive for difficulty urinating, frequency and urgency.   Neurological: Negative.        Vitals:    05/17/19 0848   BP: 110/78   BP Location: Left arm   Patient Position: Sitting   Cuff Size: Adult   Pulse: 77   Resp: 18   Temp: 98.4 °F (36.9 °C)   TempSrc: Oral   SpO2: 98%   Weight: 68.9 kg (151 lb 14.4 oz)   Height: 161.3 cm (63.5\")       Objective   Physical Exam   Constitutional: She is oriented to person, place, and time. She appears well-nourished. No distress.   Eyes: Conjunctivae are normal. No scleral icterus.   Pulmonary/Chest: Effort normal. No respiratory distress.   Genitourinary:   Genitourinary Comments: Normal anus on exam.  One small external hemorrhoid.  No fissures or bleeding.  She had good rectal tone.  " Small stool in the vault no masses palpated.   Neurological: She is alert and oriented to person, place, and time.   Psychiatric: She has a normal mood and affect. Her behavior is normal.   Vitals reviewed.      Assessment/Plan   Yajaira was seen today for urine leakage.    Diagnoses and all orders for this visit:    Incontinence of feces with fecal urgency  -     Ambulatory Referral to Physical Therapy Pelvic Floor  -     Ambulatory Referral For Screening Colonoscopy    Colon cancer screening  -     Ambulatory Referral For Screening Colonoscopy    Mixed stress and urge urinary incontinence  -     Ambulatory Referral to Physical Therapy Pelvic Floor    Encounter for screening mammogram for breast cancer  -     Mammo Screening Bilateral With CAD; Future      Patient is due for colonoscopy.  She had one at age 48.  We do not have this report he was done in [place].  She said it was normal no biopsies no polypectomies.  She has not had one since.  The stool incontinence episodes have been going on for several years she reports but were fairly infrequent.  They have gotten more persistent recently and very intrusive.  They are unexpected when they happen and she can be anywhere.  She says there is no control and stool is running out.  She never has liquid stool but she does have very loose stool with intermixed continence and formed stool.  She is due for colon cancer screening so I counseled that I think we should start there.  Given the longevity of the symptoms I am not anticipating anything ominous but I think we have to look.  Patient was in agreement with this plan.  Also refer her to physical therapy but we will do the colonoscopy first.    Mammogram is due in June  She should come back anytime after June 8 for her annual physical exam and will do labs prior to.

## 2019-05-30 ENCOUNTER — TRANSCRIBE ORDERS (OUTPATIENT)
Dept: ADMINISTRATIVE | Facility: HOSPITAL | Age: 59
End: 2019-05-30

## 2019-05-30 DIAGNOSIS — Z12.31 VISIT FOR SCREENING MAMMOGRAM: Primary | ICD-10-CM

## 2019-06-24 ENCOUNTER — HOSPITAL ENCOUNTER (OUTPATIENT)
Dept: MAMMOGRAPHY | Facility: HOSPITAL | Age: 59
Discharge: HOME | End: 2019-06-24
Admitting: FAMILY MEDICINE

## 2019-06-24 DIAGNOSIS — Z12.31 VISIT FOR SCREENING MAMMOGRAM: ICD-10-CM

## 2019-06-24 PROCEDURE — 77067 SCR MAMMO BI INCL CAD: CPT

## 2019-06-24 PROCEDURE — 77063 BREAST TOMOSYNTHESIS BI: CPT

## 2019-07-14 DIAGNOSIS — E78.5 HYPERLIPIDEMIA, UNSPECIFIED HYPERLIPIDEMIA TYPE: ICD-10-CM

## 2019-07-15 RX ORDER — ATORVASTATIN CALCIUM 20 MG/1
20 TABLET, FILM COATED ORAL DAILY
Qty: 90 TABLET | Refills: 0 | Status: SHIPPED | OUTPATIENT
Start: 2019-07-15 | End: 2019-09-08 | Stop reason: SDUPTHER

## 2019-07-16 DIAGNOSIS — G89.29 CHRONIC PAIN OF RIGHT ANKLE: ICD-10-CM

## 2019-07-16 DIAGNOSIS — R26.9 GAIT DISTURBANCE: Primary | ICD-10-CM

## 2019-07-16 DIAGNOSIS — M25.571 CHRONIC PAIN OF RIGHT ANKLE: ICD-10-CM

## 2019-07-16 DIAGNOSIS — M79.671 RIGHT FOOT PAIN: ICD-10-CM

## 2019-07-19 ENCOUNTER — HOSPITAL ENCOUNTER (OUTPATIENT)
Dept: GENERAL RADIOLOGY | Facility: HOSPITAL | Age: 59
Discharge: HOME | End: 2019-07-19
Admitting: FAMILY MEDICINE

## 2019-07-19 ENCOUNTER — HOSPITAL ENCOUNTER (OUTPATIENT)
Dept: GENERAL RADIOLOGY | Facility: HOSPITAL | Age: 59
Discharge: HOME | End: 2019-07-19

## 2019-07-19 DIAGNOSIS — M25.571 CHRONIC PAIN OF RIGHT ANKLE: ICD-10-CM

## 2019-07-19 DIAGNOSIS — R26.9 GAIT DISTURBANCE: ICD-10-CM

## 2019-07-19 DIAGNOSIS — G89.29 CHRONIC PAIN OF RIGHT ANKLE: ICD-10-CM

## 2019-07-19 DIAGNOSIS — M79.671 RIGHT FOOT PAIN: ICD-10-CM

## 2019-07-19 PROCEDURE — 73630 X-RAY EXAM OF FOOT: CPT

## 2019-07-19 PROCEDURE — 73610 X-RAY EXAM OF ANKLE: CPT

## 2019-08-22 DIAGNOSIS — E03.9 ACQUIRED HYPOTHYROIDISM: ICD-10-CM

## 2019-08-23 RX ORDER — LEVOTHYROXINE SODIUM 0.05 MG/1
50 TABLET ORAL DAILY
Qty: 90 TABLET | Refills: 1 | Status: SHIPPED | OUTPATIENT
Start: 2019-08-23 | End: 2019-12-23 | Stop reason: SDUPTHER

## 2019-09-08 DIAGNOSIS — E78.5 HYPERLIPIDEMIA, UNSPECIFIED HYPERLIPIDEMIA TYPE: ICD-10-CM

## 2019-09-09 RX ORDER — ATORVASTATIN CALCIUM 20 MG/1
20 TABLET, FILM COATED ORAL DAILY
Qty: 90 TABLET | Refills: 0 | Status: SHIPPED | OUTPATIENT
Start: 2019-09-09 | End: 2020-03-06

## 2019-12-23 DIAGNOSIS — E03.9 ACQUIRED HYPOTHYROIDISM: ICD-10-CM

## 2019-12-23 RX ORDER — LEVOTHYROXINE SODIUM 0.05 MG/1
50 TABLET ORAL DAILY
Qty: 4 TABLET | Refills: 0 | Status: SHIPPED | OUTPATIENT
Start: 2019-12-23 | End: 2020-01-13

## 2020-01-10 DIAGNOSIS — E03.9 ACQUIRED HYPOTHYROIDISM: ICD-10-CM

## 2020-01-13 RX ORDER — LEVOTHYROXINE SODIUM 0.05 MG/1
50 TABLET ORAL DAILY
Qty: 90 TABLET | Refills: 1 | Status: SHIPPED | OUTPATIENT
Start: 2020-01-13 | End: 2020-08-12

## 2020-02-24 ENCOUNTER — LAB (OUTPATIENT)
Dept: FAMILY MEDICINE CLINIC | Facility: CLINIC | Age: 60
End: 2020-02-24

## 2020-02-24 ENCOUNTER — APPOINTMENT (OUTPATIENT)
Dept: LAB | Facility: HOSPITAL | Age: 60
End: 2020-02-24

## 2020-02-24 ENCOUNTER — PATIENT MESSAGE (OUTPATIENT)
Dept: FAMILY MEDICINE CLINIC | Facility: CLINIC | Age: 60
End: 2020-02-24

## 2020-02-24 ENCOUNTER — TELEPHONE (OUTPATIENT)
Dept: FAMILY MEDICINE CLINIC | Facility: CLINIC | Age: 60
End: 2020-02-24

## 2020-02-24 DIAGNOSIS — E03.9 ACQUIRED HYPOTHYROIDISM: ICD-10-CM

## 2020-02-24 DIAGNOSIS — Z79.899 MEDICATION MANAGEMENT: ICD-10-CM

## 2020-02-24 DIAGNOSIS — E78.5 HYPERLIPIDEMIA, UNSPECIFIED HYPERLIPIDEMIA TYPE: Primary | ICD-10-CM

## 2020-02-24 DIAGNOSIS — R73.9 HYPERGLYCEMIA: Primary | ICD-10-CM

## 2020-02-24 LAB
ALBUMIN SERPL-MCNC: 4.7 G/DL (ref 3.5–5.2)
ALBUMIN/GLOB SERPL: 2 G/DL
ALP SERPL-CCNC: 118 U/L (ref 39–117)
ALT SERPL W P-5'-P-CCNC: 57 U/L (ref 1–33)
ANION GAP SERPL CALCULATED.3IONS-SCNC: 13.1 MMOL/L (ref 5–15)
AST SERPL-CCNC: 33 U/L (ref 1–32)
BASOPHILS # BLD AUTO: 0.08 10*3/MM3 (ref 0–0.2)
BASOPHILS NFR BLD AUTO: 1.5 % (ref 0–1.5)
BILIRUB SERPL-MCNC: 0.4 MG/DL (ref 0.1–1.2)
BUN BLD-MCNC: 14 MG/DL (ref 8–23)
BUN/CREAT SERPL: 18.7 (ref 7–25)
CALCIUM SPEC-SCNC: 9.5 MG/DL (ref 8.6–10.5)
CHLORIDE SERPL-SCNC: 104 MMOL/L (ref 98–107)
CHOLEST SERPL-MCNC: 158 MG/DL (ref 0–200)
CO2 SERPL-SCNC: 22.9 MMOL/L (ref 22–29)
CREAT BLD-MCNC: 0.75 MG/DL (ref 0.57–1)
DEPRECATED RDW RBC AUTO: 40.6 FL (ref 37–54)
EOSINOPHIL # BLD AUTO: 0.12 10*3/MM3 (ref 0–0.4)
EOSINOPHIL NFR BLD AUTO: 2.2 % (ref 0.3–6.2)
ERYTHROCYTE [DISTWIDTH] IN BLOOD BY AUTOMATED COUNT: 12.3 % (ref 12.3–15.4)
GFR SERPL CREATININE-BSD FRML MDRD: 78 ML/MIN/1.73
GLOBULIN UR ELPH-MCNC: 2.4 GM/DL
GLUCOSE BLD-MCNC: 103 MG/DL (ref 65–99)
HCT VFR BLD AUTO: 39.8 % (ref 34–46.6)
HDLC SERPL-MCNC: 65 MG/DL (ref 40–60)
HGB BLD-MCNC: 13.6 G/DL (ref 12–15.9)
IMM GRANULOCYTES # BLD AUTO: 0.01 10*3/MM3 (ref 0–0.05)
IMM GRANULOCYTES NFR BLD AUTO: 0.2 % (ref 0–0.5)
LDLC SERPL CALC-MCNC: 68 MG/DL (ref 0–100)
LDLC/HDLC SERPL: 1.04 {RATIO}
LYMPHOCYTES # BLD AUTO: 1.74 10*3/MM3 (ref 0.7–3.1)
LYMPHOCYTES NFR BLD AUTO: 31.6 % (ref 19.6–45.3)
MCH RBC QN AUTO: 31.1 PG (ref 26.6–33)
MCHC RBC AUTO-ENTMCNC: 34.2 G/DL (ref 31.5–35.7)
MCV RBC AUTO: 90.9 FL (ref 79–97)
MONOCYTES # BLD AUTO: 0.35 10*3/MM3 (ref 0.1–0.9)
MONOCYTES NFR BLD AUTO: 6.4 % (ref 5–12)
NEUTROPHILS # BLD AUTO: 3.21 10*3/MM3 (ref 1.7–7)
NEUTROPHILS NFR BLD AUTO: 58.1 % (ref 42.7–76)
NRBC BLD AUTO-RTO: 0 /100 WBC (ref 0–0.2)
PLATELET # BLD AUTO: 237 10*3/MM3 (ref 140–450)
PMV BLD AUTO: 10.5 FL (ref 6–12)
POTASSIUM BLD-SCNC: 4.1 MMOL/L (ref 3.5–5.2)
PROT SERPL-MCNC: 7.1 G/DL (ref 6–8.5)
RBC # BLD AUTO: 4.38 10*6/MM3 (ref 3.77–5.28)
SODIUM BLD-SCNC: 140 MMOL/L (ref 136–145)
T4 SERPL-MCNC: 7.2 MCG/DL (ref 4.5–11.7)
TRIGL SERPL-MCNC: 127 MG/DL (ref 0–150)
TSH SERPL DL<=0.05 MIU/L-ACNC: 2.06 UIU/ML (ref 0.27–4.2)
VLDLC SERPL-MCNC: 25.4 MG/DL (ref 5–40)
WBC NRBC COR # BLD: 5.51 10*3/MM3 (ref 3.4–10.8)

## 2020-02-24 PROCEDURE — 84443 ASSAY THYROID STIM HORMONE: CPT | Performed by: FAMILY MEDICINE

## 2020-02-24 PROCEDURE — 80053 COMPREHEN METABOLIC PANEL: CPT | Performed by: FAMILY MEDICINE

## 2020-02-24 PROCEDURE — 83036 HEMOGLOBIN GLYCOSYLATED A1C: CPT | Performed by: FAMILY MEDICINE

## 2020-02-24 PROCEDURE — 85025 COMPLETE CBC W/AUTO DIFF WBC: CPT | Performed by: FAMILY MEDICINE

## 2020-02-24 PROCEDURE — 84436 ASSAY OF TOTAL THYROXINE: CPT | Performed by: FAMILY MEDICINE

## 2020-02-24 PROCEDURE — 80061 LIPID PANEL: CPT | Performed by: FAMILY MEDICINE

## 2020-02-28 LAB — HBA1C MFR BLD: 5.3 % (ref 4.8–5.6)

## 2020-03-06 ENCOUNTER — OFFICE VISIT (OUTPATIENT)
Dept: FAMILY MEDICINE CLINIC | Facility: CLINIC | Age: 60
End: 2020-03-06

## 2020-03-06 VITALS
BODY MASS INDEX: 26.14 KG/M2 | RESPIRATION RATE: 13 BRPM | HEIGHT: 64 IN | OXYGEN SATURATION: 99 % | WEIGHT: 153.1 LBS | HEART RATE: 84 BPM | SYSTOLIC BLOOD PRESSURE: 104 MMHG | TEMPERATURE: 97.6 F | DIASTOLIC BLOOD PRESSURE: 62 MMHG

## 2020-03-06 DIAGNOSIS — E78.5 HYPERLIPIDEMIA, UNSPECIFIED HYPERLIPIDEMIA TYPE: ICD-10-CM

## 2020-03-06 DIAGNOSIS — Z00.00 ANNUAL PHYSICAL EXAM: Primary | ICD-10-CM

## 2020-03-06 DIAGNOSIS — F41.9 ANXIETY: ICD-10-CM

## 2020-03-06 DIAGNOSIS — E04.2 MULTINODULAR GOITER: ICD-10-CM

## 2020-03-06 DIAGNOSIS — R79.89 ABNORMAL LIVER FUNCTION TESTS: ICD-10-CM

## 2020-03-06 PROBLEM — M75.102 ROTATOR CUFF TEAR, LEFT: Status: ACTIVE | Noted: 2019-01-01

## 2020-03-06 PROBLEM — Z96.643 HISTORY OF BILATERAL HIP REPLACEMENTS: Status: ACTIVE | Noted: 2018-12-16

## 2020-03-06 PROCEDURE — 99396 PREV VISIT EST AGE 40-64: CPT | Performed by: FAMILY MEDICINE

## 2020-03-06 RX ORDER — LORAZEPAM 1 MG/1
1 TABLET ORAL EVERY MORNING
COMMUNITY
Start: 2020-02-13

## 2020-03-06 NOTE — PROGRESS NOTES
"Chief Complaint   Patient presents with   • Annual Exam       Subjective   Yajaira Mccoy is a 60 y.o. female.     History of Present Illness   Annual exam  Worried about her blood sugar  Urgency for urine and stool. She has done PT for pelvic floor. Not doing the exercises much. Wondering about medication.   Cholesterol wanting to go off the meds.   Weight gain. Thyroid concerns with this but labs were normal.   Anxiety she is doing yoga and exercise, swimming a lot. She has been on ativan since 2011. Working on this with Dr. Wilder Gutierrez.   Follows with dermatology. [facility]. Has seen them about one year ago. Has had Mohs with Dr. Keenan.  She had ECT 2014 and TMS Dr. Mendenhall, feels like pramipexole  Will do mammo and DXA together in the summer together.     The following portions of the patient's history were reviewed and updated as appropriate: allergies, current medications, past family history, past medical history, past social history, past surgical history and problem list.    Review of Systems   Constitutional: Negative for activity change, appetite change and unexpected weight change.   Respiratory: Negative for shortness of breath.    Cardiovascular: Negative for chest pain and leg swelling.   Gastrointestinal: Negative for blood in stool, constipation and diarrhea.        Bowel urgency   Genitourinary: Positive for urgency.       /62 (BP Location: Left arm, Patient Position: Sitting, Cuff Size: Adult)   Pulse 84   Temp 97.6 °F (36.4 °C) (Oral)   Resp 13   Ht 161.3 cm (63.5\")   Wt 69.4 kg (153 lb 1.6 oz)   LMP  (LMP Unknown)   SpO2 99%   Breastfeeding No   BMI 26.70 kg/m²       Objective   Physical Exam   Constitutional: She is oriented to person, place, and time. She appears well-nourished. No distress.   HENT:   Right Ear: External ear normal.   Left Ear: External ear normal.   Nose: Nose normal.   Mouth/Throat: Oropharynx is clear and moist. No oropharyngeal exudate.   TMs are clear, " canals are clear/minimal cerumen.    Eyes: Conjunctivae and EOM are normal. Right eye exhibits no discharge. Left eye exhibits no discharge. No scleral icterus.   Neck: Neck supple. No thyromegaly present.   Cardiovascular: Normal rate, regular rhythm, normal heart sounds and intact distal pulses. Exam reveals no gallop and no friction rub.   No murmur heard.  Negative for carotid bruit bilaterally.    Pulmonary/Chest: Effort normal and breath sounds normal. No respiratory distress. She has no wheezes. She has no rales. She exhibits no tenderness.   Abdominal: Soft. Bowel sounds are normal. She exhibits no distension and no mass. There is no tenderness. There is no guarding.   Musculoskeletal: She exhibits no edema or deformity.   Lymphadenopathy:     She has no cervical adenopathy.   Neurological: She is alert and oriented to person, place, and time. She exhibits normal muscle tone. Coordination normal.   Skin: Skin is warm and dry.   Psychiatric: She has a normal mood and affect. Her behavior is normal.   Vitals reviewed.      Assessment/Plan   Yajaira was seen today for annual exam.    Diagnoses and all orders for this visit:    Annual physical exam    Abnormal liver function tests  -     Comprehensive Metabolic Panel; Future    Anxiety    Multinodular goiter  -     Cancel: US Thyroid  -     US Thyroid    Hyperlipidemia, unspecified hyperlipidemia type  -     Lipid Panel; Future    Preventive counseling  We discussed the importance of regular physical activity.  Cardiovascular activity for the equivalent of 30 minutes 5 days per week.  We also discussed the importance of healthy diet to avoid weight gain and sugar intolerance.  I recommend predominantly filling the diet with vegetables and lean meats followed by fruits and whole grains.  I also recommend overall avoiding processed foods.  Good amount of exercise. We talked about her using the [employer] goldy to track and that she does some drastic things with her diet  and then binges.   Discussed making her bad habits unattainable. Even small things like watching TV downstairs instead of next to the kitchen.   She gets diagnostic mammograms annually.   She is due for colonoscopy, wants to go where her  goes and will call back with the office name.   She is going to follow up in about one month for repeat of the liver labs, very mild elevation.   She will go off the statin. Her HDL and LDL are both 65. Wants to get off meds. She is concerned about her sex drive and sugar and statins can negatively effect this. We will recheck lipids in 3 months.

## 2020-03-31 ENCOUNTER — RESULTS ENCOUNTER (OUTPATIENT)
Dept: FAMILY MEDICINE CLINIC | Facility: CLINIC | Age: 60
End: 2020-03-31

## 2020-03-31 DIAGNOSIS — R79.89 ABNORMAL LIVER FUNCTION TESTS: ICD-10-CM

## 2020-03-31 DIAGNOSIS — E78.5 HYPERLIPIDEMIA, UNSPECIFIED HYPERLIPIDEMIA TYPE: ICD-10-CM

## 2020-06-10 ENCOUNTER — TRANSCRIBE ORDERS (OUTPATIENT)
Dept: ADMINISTRATIVE | Facility: HOSPITAL | Age: 60
End: 2020-06-10

## 2020-06-10 DIAGNOSIS — R74.8 ELEVATED LIVER ENZYMES: Primary | ICD-10-CM

## 2020-06-10 DIAGNOSIS — R74.8 ELEVATED LIVER ENZYMES: ICD-10-CM

## 2020-06-10 DIAGNOSIS — Z12.39 SCREENING BREAST EXAMINATION: Primary | ICD-10-CM

## 2020-06-10 DIAGNOSIS — E78.5 HYPERLIPIDEMIA, UNSPECIFIED HYPERLIPIDEMIA TYPE: Primary | ICD-10-CM

## 2020-06-15 ENCOUNTER — RESULTS ENCOUNTER (OUTPATIENT)
Dept: FAMILY MEDICINE CLINIC | Facility: CLINIC | Age: 60
End: 2020-06-15

## 2020-06-15 ENCOUNTER — LAB (OUTPATIENT)
Dept: LAB | Facility: HOSPITAL | Age: 60
End: 2020-06-15

## 2020-06-15 DIAGNOSIS — R74.8 ELEVATED LIVER ENZYMES: ICD-10-CM

## 2020-06-15 DIAGNOSIS — E78.5 HYPERLIPIDEMIA, UNSPECIFIED HYPERLIPIDEMIA TYPE: ICD-10-CM

## 2020-06-15 LAB
ALBUMIN SERPL-MCNC: 4.5 G/DL (ref 3.5–5.2)
ALBUMIN/GLOB SERPL: 2 G/DL
ALP SERPL-CCNC: 104 U/L (ref 39–117)
ALT SERPL W P-5'-P-CCNC: 24 U/L (ref 1–33)
ANION GAP SERPL CALCULATED.3IONS-SCNC: 10.2 MMOL/L (ref 5–15)
AST SERPL-CCNC: 25 U/L (ref 1–32)
BILIRUB SERPL-MCNC: 0.3 MG/DL (ref 0.1–1.2)
BUN BLD-MCNC: 12 MG/DL (ref 8–23)
BUN/CREAT SERPL: 14.3 (ref 7–25)
CALCIUM SPEC-SCNC: 9.3 MG/DL (ref 8.6–10.5)
CHLORIDE SERPL-SCNC: 109 MMOL/L (ref 98–107)
CHOLEST SERPL-MCNC: 186 MG/DL (ref 0–200)
CO2 SERPL-SCNC: 23.8 MMOL/L (ref 22–29)
CREAT BLD-MCNC: 0.84 MG/DL (ref 0.57–1)
GFR SERPL CREATININE-BSD FRML MDRD: 69 ML/MIN/1.73
GLOBULIN UR ELPH-MCNC: 2.3 GM/DL
GLUCOSE BLD-MCNC: 94 MG/DL (ref 65–99)
HDLC SERPL-MCNC: 58 MG/DL (ref 40–60)
LDLC SERPL CALC-MCNC: 110 MG/DL (ref 0–100)
LDLC/HDLC SERPL: 1.9 {RATIO}
POTASSIUM BLD-SCNC: 3.8 MMOL/L (ref 3.5–5.2)
PROT SERPL-MCNC: 6.8 G/DL (ref 6–8.5)
SODIUM BLD-SCNC: 143 MMOL/L (ref 136–145)
TRIGL SERPL-MCNC: 90 MG/DL (ref 0–150)
VLDLC SERPL-MCNC: 18 MG/DL (ref 5–40)

## 2020-06-15 PROCEDURE — 80053 COMPREHEN METABOLIC PANEL: CPT | Performed by: FAMILY MEDICINE

## 2020-06-15 PROCEDURE — 80061 LIPID PANEL: CPT | Performed by: FAMILY MEDICINE

## 2020-06-15 PROCEDURE — 36415 COLL VENOUS BLD VENIPUNCTURE: CPT | Performed by: FAMILY MEDICINE

## 2020-07-10 ENCOUNTER — TELEPHONE (OUTPATIENT)
Dept: FAMILY MEDICINE CLINIC | Facility: CLINIC | Age: 60
End: 2020-07-10

## 2020-07-10 NOTE — TELEPHONE ENCOUNTER
"I spoke to the patient today and she said that she sent a message a couple weeks ago about getting a GI referral for \"explosive\" diarrhea after eating certain.  She said that you all have discussed this in the past.  I explained that I did not see that message, so she thinks she may not have clicked sent on accident.  Please advise if it is OK to place referral   "

## 2020-07-10 NOTE — TELEPHONE ENCOUNTER
Spoke to patient and informed her that since she does not have any symptoms she would need to visit [email or web address] to get information about drive thru testing.  She expressed understanding.

## 2020-07-10 NOTE — TELEPHONE ENCOUNTER
Patient called in wanting to know how she can get tested for the covid. She's not experiencing any symptoms but she wants to check.    Please call back to isis @ [phone]

## 2020-07-13 DIAGNOSIS — R14.0 ABDOMINAL BLOATING: ICD-10-CM

## 2020-07-13 DIAGNOSIS — R19.7 DIARRHEA, UNSPECIFIED TYPE: Primary | ICD-10-CM

## 2020-07-20 ENCOUNTER — HOSPITAL ENCOUNTER (OUTPATIENT)
Dept: MAMMOGRAPHY | Facility: HOSPITAL | Age: 60
Discharge: HOME | End: 2020-07-20
Admitting: FAMILY MEDICINE

## 2020-07-20 DIAGNOSIS — Z12.39 SCREENING BREAST EXAMINATION: ICD-10-CM

## 2020-07-20 PROCEDURE — 77063 BREAST TOMOSYNTHESIS BI: CPT

## 2020-07-20 PROCEDURE — 77067 SCR MAMMO BI INCL CAD: CPT

## 2020-08-04 ENCOUNTER — TELEPHONE (OUTPATIENT)
Dept: FAMILY MEDICINE CLINIC | Facility: CLINIC | Age: 60
End: 2020-08-04

## 2020-08-04 NOTE — TELEPHONE ENCOUNTER
PATIENT REQUESTED TO GET A PHONE CALL ABOUT WHERE SHE WOULD BE ABLE TO GO GET TESTED.     BEST CALL BACK  [phone]

## 2020-08-11 DIAGNOSIS — E03.9 ACQUIRED HYPOTHYROIDISM: ICD-10-CM

## 2020-08-12 RX ORDER — LEVOTHYROXINE SODIUM 0.05 MG/1
50 TABLET ORAL DAILY
Qty: 90 TABLET | Refills: 3 | Status: SHIPPED | OUTPATIENT
Start: 2020-08-12

## 2020-08-17 ENCOUNTER — OFFICE VISIT (OUTPATIENT)
Dept: GASTROENTEROLOGY | Facility: CLINIC | Age: 60
End: 2020-08-17

## 2020-08-17 ENCOUNTER — PREP FOR SURGERY (OUTPATIENT)
Dept: OTHER | Facility: HOSPITAL | Age: 60
End: 2020-08-17

## 2020-08-17 VITALS
DIASTOLIC BLOOD PRESSURE: 78 MMHG | OXYGEN SATURATION: 99 % | BODY MASS INDEX: 24.59 KG/M2 | HEART RATE: 69 BPM | WEIGHT: 144 LBS | HEIGHT: 64 IN | SYSTOLIC BLOOD PRESSURE: 112 MMHG | TEMPERATURE: 97.7 F

## 2020-08-17 DIAGNOSIS — Z12.11 ENCOUNTER FOR SCREENING FOR MALIGNANT NEOPLASM OF COLON: ICD-10-CM

## 2020-08-17 DIAGNOSIS — Z86.010 HISTORY OF COLON POLYPS: ICD-10-CM

## 2020-08-17 DIAGNOSIS — R15.9 INCONTINENCE OF FECES WITH FECAL URGENCY: Primary | ICD-10-CM

## 2020-08-17 DIAGNOSIS — R15.2 INCONTINENCE OF FECES WITH FECAL URGENCY: Primary | ICD-10-CM

## 2020-08-17 PROCEDURE — 99204 OFFICE O/P NEW MOD 45 MIN: CPT | Performed by: INTERNAL MEDICINE

## 2020-08-17 NOTE — PROGRESS NOTES
GI Problem (Bowel movement Urgnecy )      HPI  Patient is a 60 year old female who presents today for evaluation.    Patient presents today with concerns about fecal urgency and fecal incontinence. It often occurs after eating. She reports food such as spinach and sundried tomatoes can trigger this. Symptoms have been present for years and are worsening. Walking can make this worse.    She previously had pelvic floor physical therapy, which helped somewhat but did not completely alleviate her symptoms.    She reports she is aware of the need to have a bowel movement when this occurs, but is unable to hold back the bowel movement. If her stool is more solid, she generally does not have fecal incontinence.    She denies any significant trauma with the birth of her children.    She reports urinary incontinence as well.    She denies heartburn or reflux. Denies dysphagia.    She reports history of colon polyps. She reports she is due for her colonoscopy.    She has recently started a probiotic.    She denies mucus or blood in the stool. Denies family history of colon cancer. Weight is stable.    She has had surgeries to remove ovarian cysts and reports a history of endometriosis.    Review of Systems   Constitutional: Negative for appetite change, chills, diaphoresis, fatigue, fever and unexpected weight change.   HENT: Negative for dental problem, ear pain, mouth sores, rhinorrhea, sore throat and voice change.    Eyes: Negative for pain, redness and visual disturbance.   Respiratory: Negative for cough, chest tightness and wheezing.    Cardiovascular: Negative for chest pain, palpitations and leg swelling.   Endocrine: Negative for cold intolerance, heat intolerance, polydipsia, polyphagia and polyuria.   Genitourinary: Positive for urgency. Negative for dysuria, frequency and hematuria.   Musculoskeletal: Negative for arthralgias, back pain, joint swelling, myalgias and neck pain.   Skin: Negative for rash.    Allergic/Immunologic: Negative for environmental allergies, food allergies and immunocompromised state.   Neurological: Negative for dizziness, seizures, weakness, numbness and headaches.   Hematological: Does not bruise/bleed easily.   Psychiatric/Behavioral: Negative for sleep disturbance. The patient is nervous/anxious.         I have reviewed and confirmed the accuracy of the HPI and ROS as documented by the APRN Ernie Salazar MD     Problem List:    Patient Active Problem List   Diagnosis   • Abnormal liver function tests   • Anxiety   • Depression   • Serum creatinine raised   • Hematuria   • Hyperlipidemia   • Hypothyroidism   • Major depressive disorder, recurrent episode, severe (CMS/HCC)   • Osteoarthritis of hip   • Impaired glucose tolerance   • Mixed incontinence   • History of bilateral hip replacements   • Rotator cuff tear, left   • Multinodular goiter       Medical History:    Past Medical History:   Diagnosis Date   • Anxiety    • Arthritis    • Breast cancer (CMS/HCC) 1993    lt   • Cancer (CMS/HCC)     Breast   • Depression    • Drug therapy 1993   • Hx of radiation therapy     LT 1993   • Radiation 1993    lt   • Skin cancer     basal cell carcinoma        Social History:    Social History     Socioeconomic History   • Marital status:      Spouse name: joann   • Number of children: 2   • Years of education: Not on file   • Highest education level: Not on file   Occupational History   • Occupation: Audiologist   Tobacco Use   • Smoking status: Former Smoker     Packs/day: 0.50     Years: 9.00     Pack years: 4.50   • Smokeless tobacco: Never Used   • Tobacco comment: quit 1982   Substance and Sexual Activity   • Alcohol use: Yes     Comment: 4-5 glasses of  wine per week   • Drug use: No   • Sexual activity: Never     Partners: Male       Family History:   Family History   Problem Relation Age of Onset   • Thyroid disease Mother    • Lung cancer Father         smoker   • Ovarian  cancer Maternal Grandmother    • Breast cancer Maternal Aunt        Surgical History:   Past Surgical History:   Procedure Laterality Date   • BREAST EXCISIONAL BIOPSY Left     LT 1993   • BREAST EXCISIONAL BIOPSY Right     2011   • BREAST LUMPECTOMY Left 1993   • LYMPH NODE DISSECTION     • MOHS SURGERY     • OOPHORECTOMY Bilateral     age 38   • TOTAL HIP ARTHROPLASTY Right 12/2014   • TOTAL HIP ARTHROPLASTY REVISION Left 01/2018   • TOTAL LAPAROSCOPIC HYSTERECTOMY SALPINGO OOPHORECTOMY Bilateral          Current Outpatient Medications:   •  buPROPion XL (WELLBUTRIN XL) 300 MG 24 hr tablet, , Disp: , Rfl:   •  levothyroxine (SYNTHROID, LEVOTHROID) 50 MCG tablet, TAKE 1 TABLET BY MOUTH  DAILY, Disp: 90 tablet, Rfl: 3  •  LORazepam (ATIVAN) 1 MG tablet, Take 1 mg by mouth Every Morning., Disp: , Rfl:   •  Multiple Vitamins-Minerals (MULTIVITAMIN WITH MINERALS) tablet tablet, Take 1 tablet by mouth Daily., Disp: , Rfl:   •  Omega-3 Fatty Acids (FISH OIL) 1000 MG capsule capsule, Take 1 mg by mouth Daily With Breakfast., Disp: , Rfl:   •  pramipexole (MIRAPEX) 1 MG tablet, Take 2 mg by mouth Daily., Disp: , Rfl:     Allergies:   Allergies   Allergen Reactions   • Naproxen Rash     aleve        The following portions of the patient's history were reviewed and updated as appropriate: allergies, current medications, past family history, past medical history, past social history, past surgical history and problem list.    Vitals:    08/17/20 1421   BP: 112/78   Pulse: 69   Temp: 97.7 °F (36.5 °C)   SpO2: 99%         08/17/20  1421   Weight: 65.3 kg (144 lb)     Body mass index is 25.11 kg/m².      PHYSICAL EXAM:  Physical Exam   Constitutional: She appears well-developed.   HENT:   Nose: Nose normal. No nasal deformity.   Eyes: No scleral icterus.   Neck: No tracheal deviation present.   Pulmonary/Chest: Effort normal and breath sounds normal. No respiratory distress.   Abdominal: Soft. Normal appearance and bowel sounds  are normal. She exhibits no shifting dullness and no distension. There is no hepatosplenomegaly. There is no tenderness. There is no rigidity, no rebound and no guarding. No hernia.   Lymphadenopathy:   No periumbilical lymphadenopathy   Neurological: She is alert.   Skin: Skin is warm. No cyanosis.   Psychiatric: She has a normal mood and affect. Her behavior is normal.   Vitals reviewed.          Assessment/ Plan  Yajaira was seen today for gi problem.    Diagnoses and all orders for this visit:    Incontinence of feces with fecal urgency    History of colon polyps         Return for Review results after testing complete.    Patient Instructions   Schedule colonoscopy for further evaluation of symptoms and updated colon cancer screening.    Recommend starting a daily fiber supplement such as Citrucel, Metamucil, or FiberCon available over-the-counter.    If colonoscopy is unremarkable, to consider referral for anorectal manometry for further evaluation of symptoms.    Follow-up after testing complete.  Call for any new or worsening symptoms.        Discussion:  Patient has worsening issues with fecal incontinence.  We discussed possible etiologies.  We also advised her to be more strict with her Kegel exercises and to increase fiber intake with soluble fiber supplements.  May consider anorectal manometry if no improvement.  We will also proceed with a colonoscopy both for screening and for evaluation of possible microscopic colitis and we will perform random colon biopsies at the time of colonoscopy.  Further recommendations pending results of colonoscopy.    Documentation by Jayshree AZUL acting as a scribe in the following sections on behalf of the billable provider: HPI, ROS, assessment, & plan.

## 2020-08-17 NOTE — PATIENT INSTRUCTIONS
Schedule colonoscopy for further evaluation of symptoms and updated colon cancer screening.    Recommend starting a daily fiber supplement such as Citrucel, Metamucil, or FiberCon available over-the-counter.    If colonoscopy is unremarkable, to consider referral for anorectal manometry for further evaluation of symptoms.    Follow-up after testing complete.  Call for any new or worsening symptoms.

## 2021-03-19 ENCOUNTER — BULK ORDERING (OUTPATIENT)
Dept: CASE MANAGEMENT | Facility: OTHER | Age: 61
End: 2021-03-19

## 2021-03-19 DIAGNOSIS — Z23 IMMUNIZATION DUE: ICD-10-CM

## 2021-09-23 DIAGNOSIS — E03.9 ACQUIRED HYPOTHYROIDISM: ICD-10-CM

## 2021-09-23 RX ORDER — LEVOTHYROXINE SODIUM 0.05 MG/1
50 TABLET ORAL DAILY
Qty: 90 TABLET | Refills: 3 | OUTPATIENT
Start: 2021-09-23

## 2021-09-23 NOTE — TELEPHONE ENCOUNTER
Rx Refill Note  Requested Prescriptions     Pending Prescriptions Disp Refills   • levothyroxine (SYNTHROID, LEVOTHROID) 50 MCG tablet [Pharmacy Med Name: Levothyroxine Sodium 50 MCG Oral Tablet] 90 tablet 3     Sig: TAKE 1 TABLET BY MOUTH  DAILY      Last office visit with prescribing clinician: 3/6/2020      Next office visit with prescribing clinician: Visit date not found        TSH (02/24/2020 10:44)      Theresa Jain LPN  09/23/21, 12:24 EDT

## 2021-09-23 NOTE — TELEPHONE ENCOUNTER
This patient is no longer under [facility] care. Lives in [place] now and has a new provider. This should have never been sent to provider.